# Patient Record
(demographics unavailable — no encounter records)

---

## 2024-10-28 NOTE — PHYSICAL EXAM
[Left] : left foot and ankle [3rd] : 3rd [4th] : 4th [NL (40)] : plantar flexion 40 degrees [NL 30)] : inversion 30 degrees [5___] : eversion 5[unfilled]/5 [2+] : posterior tibialis pulse: 2+ [Normal] : saphenous nerve sensation normal [] : patient ambulates without assistive device [[email or web address]] : dorsiflexion 15 degrees [de-identified] : eversion 15 degrees

## 2024-10-28 NOTE — DISCUSSION/SUMMARY
[de-identified] : Point tenderness to the 4th met.  Pt declines MRI of left foot to rule out 4th met stress fracture.  She will go into CAM boot she has at home.  Will re-evaluate in 2 weeks.

## 2025-06-18 NOTE — PHYSICAL EXAM
[NL (40)] : plantar flexion 40 degrees [NL 30)] : inversion 30 degrees [5___] : eversion 5[unfilled]/5 [2+] : posterior tibialis pulse: 2+ [Normal] : saphenous nerve sensation normal [3rd] : 3rd [NL (20)] : eversion 20 degrees [No loss of surgical correlation. Bony alignment acceptable. Hardware in appropriate position] : No loss of surgical correlation. Bony alignment acceptable. Hardware in appropriate position [Left] : left foot [Weight -] : weightbearing [] : non-antalgic [[email or web address]] :  AP and mortise xrays of the ankle were taken and reviewed today. Mild tibiotalar joint narrowing, unchanged compared to previous films.   [de-identified] :  AP, lateral and oblique xray views were taken and reviewed today.  [[email or web address]] : dorsiflexion 15 degrees

## 2025-06-18 NOTE — DISCUSSION/SUMMARY
[de-identified] : WBAT in supportive footwear. Ice to affected area.  The "lipstick technique" was discussed with the patient in detail, and a metatarsal pad was provided to the patient.  NSAIDS prn.   If still symptomatic despite above recommendations, RTO.

## 2025-06-18 NOTE — HISTORY OF PRESENT ILLNESS
[Left Leg] : left leg [5] : 5 [2] : 2 [de-identified] : JYOTI SPARKS a 41 year old female here for follow up of her LT foot/ankle.  Patient states she woke up sometime during the week on 10/20/24 and took step and felt a dull pain in foot. She was evaluated and there was concern for a fourth metatarsal stress fracture. She never followed up. She reports always having intermittent foot pain that can radiate to multiple spots in the foot. History of LT ankle ORIF.